# Patient Record
Sex: MALE | Race: WHITE | NOT HISPANIC OR LATINO | Employment: OTHER | URBAN - METROPOLITAN AREA
[De-identification: names, ages, dates, MRNs, and addresses within clinical notes are randomized per-mention and may not be internally consistent; named-entity substitution may affect disease eponyms.]

---

## 2019-04-02 ENCOUNTER — TRANSFERRED RECORDS (OUTPATIENT)
Dept: HEALTH INFORMATION MANAGEMENT | Facility: CLINIC | Age: 69
End: 2019-04-02

## 2019-05-08 ENCOUNTER — PRE VISIT (OUTPATIENT)
Dept: SLEEP MEDICINE | Facility: CLINIC | Age: 69
End: 2019-05-08

## 2019-05-08 RX ORDER — FEBUXOSTAT 40 MG/1
40 TABLET, FILM COATED ORAL
COMMUNITY
Start: 2019-03-15

## 2019-05-08 RX ORDER — PANTOPRAZOLE SODIUM 40 MG/1
40 TABLET, DELAYED RELEASE ORAL
COMMUNITY
Start: 2019-03-15

## 2019-05-08 RX ORDER — ATENOLOL 50 MG/1
25 TABLET ORAL
COMMUNITY
Start: 2019-03-15

## 2019-05-08 NOTE — TELEPHONE ENCOUNTER
"  1.  Reason for the visit:  Consult to discuss possible sleep apnea  2.  Referring provider and clinic name:  Dr. Cornelio Green  3.  Previous Sleep Doctor or Pulmonlogist (clinic name)?  None noted  4.  Records, Procedures, Imaging, and Labs (see below)  No sleep records to obtain, but pt had [facility] records faxed over        All NOTES from previous office visits that pertain to why they are being seen in the Sleep Center    Previous Sleep Studies, Chest CT, Echos and reports that pertain to why they are seeing Sleep Center    All Sleep records that have been done in the last 2 years that pertain to why they are seeing Sleep Center            Are they being seen for continuation of care for Cpap/Bipap/Avap/Trilogy/Dental Device? None    If yes to above Who and Where was Device issued/currently getting supplies from? na    Are you currently on \"Supplemental Oxygen\" during the day or night?   na                                                                                                                                                      Please remind pt to bring Cpap machine and ask to arrive 15 minutes early to appointment due traffic and congestion                                                 5. Pt Sleep Center Packet received Message left asking pt to arrive 30 minutes early to appointment if no packet received.        Yes: \"please make sure that you bring this to your appointment completed, either the doctor will not see you until this completed or you may be asked to reschedule your appointment.\"     No: mail or email to the pt and explain, \"please make sure that you bring this to your appointment completed, either the doctor will not see you until this completed or you may be asked to reschedule your appointment.\"     ~If pt coming early to fill packet out, ask that they come 30 minutes prior to their appointment~     6. Has the pt's medication list been updated and preferred pharmacy added?     7. " "Has the allergy list been reviewed?    \"Thank you for choosing [facility] and we look forward to seeing you at your upcoming appointment\"     "

## 2019-05-09 ENCOUNTER — OFFICE VISIT (OUTPATIENT)
Dept: SLEEP MEDICINE | Facility: CLINIC | Age: 69
End: 2019-05-09
Payer: MEDICARE

## 2019-05-09 VITALS
HEIGHT: 64 IN | DIASTOLIC BLOOD PRESSURE: 81 MMHG | OXYGEN SATURATION: 98 % | HEART RATE: 66 BPM | WEIGHT: 184 LBS | BODY MASS INDEX: 31.41 KG/M2 | RESPIRATION RATE: 16 BRPM | SYSTOLIC BLOOD PRESSURE: 140 MMHG

## 2019-05-09 DIAGNOSIS — D75.1 ERYTHROCYTOSIS: ICD-10-CM

## 2019-05-09 PROCEDURE — 99204 OFFICE O/P NEW MOD 45 MIN: CPT | Performed by: INTERNAL MEDICINE

## 2019-05-09 ASSESSMENT — MIFFLIN-ST. JEOR: SCORE: 1520.62

## 2019-05-09 NOTE — PROGRESS NOTES
Chief complaint: Consultation requested by Ashok Lewis MD for the evaluation of possible hypoxemia    History of Present Illness: 68-year-old gentleman recently diagnosed with chronic lymphocytic leukemia.  He has a history of mild erythrocytosis for a few years.  Evaluation in progress.  Patient has a history of some mild intermittent snoring. He had developed fatigue particularly in the last year prior to this diagnosis.  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  He is managing this very well however and denies difficulty falling asleep.  He has a strong routine.  No over-the-counter or prescribed sleep aids.  Typically will get into bed to sleep around 11:30 pm and fall asleep in less than 15 minutes.  He usually is out of bed around 6:30 in the morning.  He has been taking more naps in the last year currently up to about 2 to 3/week, 1 to 1-1/2 hours and he otherwise denies excessive sleepiness.  No sleep attacks or sleepiness behind the wheel.  He does not drink excessive caffeine.  2-3 8-10 oz Diet Cokes a day, sometimes as late as dinnertime.  He does wake up about once a night to go to the bathroom.  He usually can fall back asleep quickly.     He denies symptoms of restless legs.  No sleepwalking, sleep talking, or dream enactment behavior. No bruxism or morning headaches. He thinks he snores only a couple of nights a week and not particularly loud.  If the air is very dry he will have more nasal congestion.  He usually sleeps on his stomach.  There is no known family history of obstructive sleep apnea but his father snored.  Overall his weight has been stable.  He does not drink excessive alcohol.  Maybe 1 glass a week.  He tolerates travel across time zones very well.    There is no history of smoking or risks for lung disease.  No history of asthma.  No significant cardiovascular disease history.  He denies symptoms of PND, orthopnea, nocturnal  gasping or choking.  No symptoms of dyspnea with exertion during the day.  He gets regular, vigorous exercise and denies cardiopulmonary limitation.  Is able to sleep on a flat bed.  He is not on exogenous testosterone.     [facility] Seepiness Scale:4 (Less than 10 normal)    FARIDA Total Score: 13    STOP-BANG  Loud Snore   0  Excessively Tired/Sleepy   0  Observed apnea   0  Hypertension   0  BMI> 35 kg/m2   0  Age >50   1  Neck >16 in/40cm   1  Male Gender   1  Total =   3  (0-2 low, 3-4 intermediate, 5-8 high risk of KEENAN)      Past Medical History:   Diagnosis Date     Adenomatous polyp      Angioedema      Travis's oesophagus with dysplasia      CLL (chronic lymphocytic leukemia) (H) 03/22/2019    stage at dx Wilkes 0, CD 20 positive 11q del unkown, 17p del unknown     Gastro-oesophageal reflux disease      Hearing loss     mild     Hyperuricemia      Psoriasis      Renal disease     Borderline renal insufficiency; kidney stones     Tinnitus      Vocal cord granuloma        Allergies   Allergen Reactions     Allopurinol Hives     Zyrtec [Cetirizine]      Lethhargy       Current Outpatient Medications   Medication     Ascorbic Acid (VITAMIN C PO)     ASPIRIN PO     atenolol (TENORMIN) 50 MG tablet     ATORVASTATIN CALCIUM PO     febuxostat (ULORIC) 40 MG TABS tablet     Fexofenadine HCl (ALLEGRA PO)     multivitamin, therapeutic with minerals (MULTI-VITAMIN) TABS     OMEPRAZOLE PO     pantoprazole (PROTONIX) 40 MG EC tablet     Solifenacin Succinate (VESICARE PO)     No current facility-administered medications for this visit.        Social History     Socioeconomic History     Marital status:      Spouse name: Not on file     Number of children: Not on file     Years of education: Not on file     Highest education level: Not on file   Occupational History     Not on file   Social Needs     Financial resource strain: Not on file     Food insecurity:     Worry: Not on file     Inability: Not on file      "Transportation needs:     Medical: Not on file     Non-medical: Not on file   Tobacco Use     Smoking status: Never Smoker     Smokeless tobacco: Never Used   Substance and Sexual Activity     Alcohol use: Yes     Comment: 1 per week     Drug use: No     Sexual activity: Not on file   Lifestyle     Physical activity:     Days per week: Not on file     Minutes per session: Not on file     Stress: Not on file   Relationships     Social connections:     Talks on phone: Not on file     Gets together: Not on file     Attends Sabianist service: Not on file     Active member of club or organization: Not on file     Attends meetings of clubs or organizations: Not on file     Relationship status: Not on file     Intimate partner violence:     Fear of current or ex partner: Not on file     Emotionally abused: Not on file     Physically abused: Not on file     Forced sexual activity: Not on file   Other Topics Concern     Parent/sibling w/ CABG, MI or angioplasty before 65F 55M? Not Asked   Social History Narrative     Not on file       Family History   Problem Relation Age of Onset     Coronary Artery Disease Mother      Hypertension Father      Cerebrovascular Disease Father      Gallbladder Disease Father      Snoring Father      Prostate Cancer Father 70        skin ca 50     Coronary Artery Disease Paternal Grandfather      Alcoholism Brother        Review of Systems: Positve for occasional mole in her infected lymph nodes this is been long-standing, some changes in his vision, occasional sinus pain, occasional elevated blood pressure measurements, and per HPI, otherwise comprehensive review of systems is negative.    EXAM:Alert, no distress, breathing appears comfortable  /81   Pulse 66   Resp 16   Ht 1.626 m (5' 4\")   Wt 83.5 kg (184 lb)   SpO2 98%   BMI 31.58 kg/m    HEENT: Normocephalic/atraumatic, pupils are equal, reactive to light, no scleral icterus  Oropharynx: Mallampati 4, unable to visualize " posterior pharynx  Neck supple no lymphadenopathy, neck circumference 43 cm  Chest: Clear to auscultation bilaterally, no rales or wheezes  Cardiac: Regular rate and rhythm, S1-S2 normal  Abdomen: Obese, soft and nontender, bowel sounds present  Extremities: Warm, well perfused, no cyanosis clubbing or edema  Psychiatric: Mood and affect appear normal  Neurologic: No gross focal deficits    Outside labs reviewed:  Erythropoietin level 4/2/2019 normal at 13.3  Hemoglobin range 16.6 up to 18.4 and 2015    Chest x-ray on 11/20/2015 reported by radiology is normal    ASSESSMENT: 68-year-old gentleman with history of sleep initiation insomnia currently well controlled.  He recent history of increasing fatigue and diagnosis of chronic lymphocytic leukemia.  Erythrocytosis has been intermittently present at least for the last 4 years.  He has some signs of risk for obstructive sleep apnea however no significant symptoms.  Do not suspect underlying cardiopulmonary disease, no risk factors for hypoventilation.  I would suspect that significant hypoxemia would lead to elevations in erythropoietin levels in order to stimulate compensatory erythrocytosis.    PLAN: At this point we discussed looking for objective evidence of hypoxemia with overnight oximetry in the home versus in lab polysomnography.  Elected to proceed with overnight oximetry given low to intermediate risk of obstructive sleep apnea and patient's preference.  I think this would be reasonable.  If indeed he has significant hypoxemia detected by overnight oximetry patient it would be agreeable to proceeding more detailed and sensitive testing in the sleep lab.  If this is the case would recommend pulmonary function testing and transcutaneous CO2 monitoring as well as ABG testing prior.  I will be contacting patient via [employer] with results of the overnight oximetry when they become available.    Bernadine Santoro M.D.  Pulmonary/Critical Care/Sleep Medicine    [facility]  [facility]   Floor 1, [address]   [address] [place] [address]   Appointments: [phone]    The above note was dictated using voice recognition software and may include typographical errors. Please contact the author for any clarifications.

## 2019-05-09 NOTE — LETTER
5/9/2019         RE: Willard Burgos  [address]  [place] [address]        Dear Colleague,    Thank you for referring your patient, Willard Burgos, to the [facility]. Please see a copy of my visit note below.    Chief complaint: Consultation requested by Ashok Lewis MD for the evaluation of possible hypoxemia    History of Present Illness: 68-year-old gentleman recently diagnosed with chronic lymphocytic leukemia.  He has a history of mild erythrocytosis for a few years.  Evaluation in progress.  Patient has a history of some mild intermittent snoring. He had developed fatigue particularly in the last year prior to this diagnosis.  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  He is managing this very well however and denies difficulty falling asleep.  He has a strong routine.  No over-the-counter or prescribed sleep aids.  Typically will get into bed to sleep around 11:30 pm and fall asleep in less than 15 minutes.  He usually is out of bed around 6:30 in the morning.  He has been taking more naps in the last year currently up to about 2 to 3/week, 1 to 1-1/2 hours and he otherwise denies excessive sleepiness.  No sleep attacks or sleepiness behind the wheel.  He does not drink excessive caffeine.  2-3 8-10 oz Diet Cokes a day, sometimes as late as dinnertime.  He does wake up about once a night to go to the bathroom.  He usually can fall back asleep quickly.     He denies symptoms of restless legs.  No sleepwalking, sleep talking, or dream enactment behavior. No bruxism or morning headaches. He thinks he snores only a couple of nights a week and not particularly loud.  If the air is very dry he will have more nasal congestion.  He usually sleeps on his stomach.  There is no known family history of obstructive sleep apnea but his father snored.  Overall his weight has been stable.  He does not drink excessive alcohol.  Maybe 1  glass a week.  He tolerates travel across time zones very well.    There is no history of smoking or risks for lung disease.  No history of asthma.  No significant cardiovascular disease history.  He denies symptoms of PND, orthopnea, nocturnal gasping or choking.  No symptoms of dyspnea with exertion during the day.  He gets regular, vigorous exercise and denies cardiopulmonary limitation.  Is able to sleep on a flat bed.  He is not on exogenous testosterone.     [place] Seepiness Scale:4 (Less than 10 normal)    FARIDA Total Score: 13    STOP-BANG  Loud Snore   0  Excessively Tired/Sleepy   0  Observed apnea   0  Hypertension   0  BMI> 35 kg/m2   0  Age >50   1  Neck >16 in/40cm   1  Male Gender   1  Total =   3  (0-2 low, 3-4 intermediate, 5-8 high risk of KEENAN)      Past Medical History:   Diagnosis Date     Adenomatous polyp      Angioedema      Travis's oesophagus with dysplasia      CLL (chronic lymphocytic leukemia) (H) 03/22/2019    stage at dx Wilkes 0, CD 20 positive 11q del unkown, 17p del unknown     Gastro-oesophageal reflux disease      Hearing loss     mild     Hyperuricemia      Psoriasis      Renal disease     Borderline renal insufficiency; kidney stones     Tinnitus      Vocal cord granuloma        Allergies   Allergen Reactions     Allopurinol Hives     Zyrtec [Cetirizine]      Lethhargy       Current Outpatient Medications   Medication     Ascorbic Acid (VITAMIN C PO)     ASPIRIN PO     atenolol (TENORMIN) 50 MG tablet     ATORVASTATIN CALCIUM PO     febuxostat (ULORIC) 40 MG TABS tablet     Fexofenadine HCl (ALLEGRA PO)     multivitamin, therapeutic with minerals (MULTI-VITAMIN) TABS     OMEPRAZOLE PO     pantoprazole (PROTONIX) 40 MG EC tablet     Solifenacin Succinate (VESICARE PO)     No current facility-administered medications for this visit.        Social History     Socioeconomic History     Marital status:      Spouse name: Not on file     Number of children: Not on file     Years of  education: Not on file     Highest education level: Not on file   Occupational History     Not on file   Social Needs     Financial resource strain: Not on file     Food insecurity:     Worry: Not on file     Inability: Not on file     Transportation needs:     Medical: Not on file     Non-medical: Not on file   Tobacco Use     Smoking status: Never Smoker     Smokeless tobacco: Never Used   Substance and Sexual Activity     Alcohol use: Yes     Comment: 1 per week     Drug use: No     Sexual activity: Not on file   Lifestyle     Physical activity:     Days per week: Not on file     Minutes per session: Not on file     Stress: Not on file   Relationships     Social connections:     Talks on phone: Not on file     Gets together: Not on file     Attends Hinduism service: Not on file     Active member of club or organization: Not on file     Attends meetings of clubs or organizations: Not on file     Relationship status: Not on file     Intimate partner violence:     Fear of current or ex partner: Not on file     Emotionally abused: Not on file     Physically abused: Not on file     Forced sexual activity: Not on file   Other Topics Concern     Parent/sibling w/ CABG, MI or angioplasty before 65F 55M? Not Asked   Social History Narrative     Not on file       Family History   Problem Relation Age of Onset     Coronary Artery Disease Mother      Hypertension Father      Cerebrovascular Disease Father      Gallbladder Disease Father      Snoring Father      Prostate Cancer Father 70        skin ca 50     Coronary Artery Disease Paternal Grandfather      Alcoholism Brother        Review of Systems: Positve for occasional mole in her infected lymph nodes this is been long-standing, some changes in his vision, occasional sinus pain, occasional elevated blood pressure measurements, and per HPI, otherwise comprehensive review of systems is negative.    EXAM:Alert, no distress, breathing appears comfortable  /81    "Pulse 66   Resp 16   Ht 1.626 m (5' 4\")   Wt 83.5 kg (184 lb)   SpO2 98%   BMI 31.58 kg/m     HEENT: Normocephalic/atraumatic, pupils are equal, reactive to light, no scleral icterus  Oropharynx: Mallampati 4, unable to visualize posterior pharynx  Neck supple no lymphadenopathy, neck circumference 43 cm  Chest: Clear to auscultation bilaterally, no rales or wheezes  Cardiac: Regular rate and rhythm, S1-S2 normal  Abdomen: Obese, soft and nontender, bowel sounds present  Extremities: Warm, well perfused, no cyanosis clubbing or edema  Psychiatric: Mood and affect appear normal  Neurologic: No gross focal deficits    Outside labs reviewed:  Erythropoietin level 4/2/2019 normal at 13.3  Hemoglobin range 16.6 up to 18.4 and 2015    Chest x-ray on 11/20/2015 reported by radiology is normal    ASSESSMENT: 68-year-old gentleman with history of sleep initiation insomnia currently well controlled.  He recent history of increasing fatigue and diagnosis of chronic lymphocytic leukemia.  Erythrocytosis has been intermittently present at least for the last 4 years.  He has some signs of risk for obstructive sleep apnea however no significant symptoms.  Do not suspect underlying cardiopulmonary disease, no risk factors for hypoventilation.  I would suspect that significant hypoxemia would lead to elevations in erythropoietin levels in order to stimulate compensatory erythrocytosis.    PLAN: At this point we discussed looking for objective evidence of hypoxemia with overnight oximetry in the home versus in lab polysomnography.  Elected to proceed with overnight oximetry given low to intermediate risk of obstructive sleep apnea and patient's preference.  I think this would be reasonable.  If indeed he has significant hypoxemia detected by overnight oximetry patient it would be agreeable to proceeding more detailed and sensitive testing in the sleep lab.  If this is the case would recommend pulmonary function testing and " transcutaneous CO2 monitoring as well as ABG testing prior.  I will be contacting patient via [employer] with results of the overnight oximetry when they become available.    Bernadine Santoro M.D.  Pulmonary/Critical Care/Sleep Medicine    [facility]   Floor 1, [address]   [address] [place] [address]   Appointments: [phone]    The above note was dictated using voice recognition software and may include typographical errors. Please contact the author for any clarifications.              Again, thank you for allowing me to participate in the care of your patient.        Sincerely,        Bernadine Santoro MD

## 2019-05-09 NOTE — PATIENT INSTRUCTIONS
Overnight oximetry in the home--usual routine    I stephanie contact you by Alina with results.    Your BMI is Body mass index is 31.58 kg/m .  Weight management is a personal decision.  If you are interested in exploring weight loss strategies, the following discussion covers the approaches that may be successful. Body mass index (BMI) is one way to tell whether you are at a healthy weight, overweight, or obese. It measures your weight in relation to your height.  A BMI of 18.5 to 24.9 is in the healthy range. A person with a BMI of 25 to 29.9 is considered overweight, and someone with a BMI of 30 or greater is considered obese. More than two-thirds of American adults are considered overweight or obese.  Being overweight or obese increases the risk for further weight gain. Excess weight may lead to heart disease and diabetes.  Creating and following plans for healthy eating and physical activity may help you improve your health.  Weight control is part of healthy lifestyle and includes exercise, emotional health, and healthy eating habits. Careful eating habits lifelong are the mainstay of weight control. Though there are significant health benefits from weight loss, long-term weight loss with diet alone may be very difficult to achieve- studies show long-term success with dietary management in less than 10% of people. Attaining a healthy weight may be especially difficult to achieve in those with severe obesity. In some cases, medications, devices and surgical management might be considered.  What can you do?  If you are overweight or obese and are interested in methods for weight loss, you should discuss this with your provider.     Consider reducing daily calorie intake by 500 calories.     Keep a food journal.     Avoiding skipping meals, consider cutting portions instead.    Diet combined with exercise helps maintain muscle while optimizing fat loss. Strength training is particularly important for building and  maintaining muscle mass. Exercise helps reduce stress, increase energy, and improves fitness. Increasing exercise without diet control, however, may not burn enough calories to loose weight.       Start walking three days a week 10-20 minutes at a time    Work towards walking thirty minutes five days a week     Eventually, increase the speed of your walking for 1-2 minutes at time    In addition, we recommend that you review healthy lifestyles and methods for weight loss available through the National Institutes of Health patient information sites:  http://win.niddk.nih.gov/publications/index.htm    And look into health and wellness programs that may be available through your health insurance provider, employer, local community center, or harvey club.    Weight management plan: Patient was referred to their PCP to discuss a diet and exercise plan.

## 2019-06-04 ENCOUNTER — OFFICE VISIT (OUTPATIENT)
Dept: SLEEP MEDICINE | Facility: CLINIC | Age: 69
End: 2019-06-04
Payer: MEDICARE

## 2019-06-04 DIAGNOSIS — D75.1 ERYTHROCYTOSIS: ICD-10-CM

## 2019-06-04 NOTE — PROGRESS NOTES
Patient instructed on SHONDA use. Patient demonstrated and verbalized knowledge of use. Insurance was verified by financial securing. Device programmed. Device will be returned tomorrow before noon.    Devendra Limon  Sleep Clinic Specialist - [place]

## 2019-06-05 ENCOUNTER — DOCUMENTATION ONLY (OUTPATIENT)
Dept: SLEEP MEDICINE | Facility: CLINIC | Age: 69
End: 2019-06-05
Payer: MEDICARE

## 2019-06-05 NOTE — LETTER
June 6, 2019    Dear Willard Burgos:     OVERNIGHT OXIMETRY INTERPRETATION     I reviewed the overnight oximetry testing results from 4 June 2019 done on room air and they are satisfactory.  Analysis time:  6 Hours 50 Minutes.  Time at or below 88% oxygen saturation 0.1 Minute.  Lowest oxygen saturation 87 %  Oxygen desaturation index 3.5  Pattern consistent with normoxia.     Please share these results with your oncologist       Thank You,         Bernadine Santoro M.D.  Pulmonary/Critical Care/Sleep Medicine            If you have any other questions please contact the clinic at [phone].

## 2019-06-05 NOTE — Clinical Note
SHONDA has been returned, downloaded, and report has been placed in providers folder.Devendra [facility] Specialist - [place]

## 2019-06-06 NOTE — PROCEDURES
OVERNIGHT OXIMETRY INTERPRETATION     I reviewed the overnight oximetry testing results from 4 June 2019 done on room air and they are satisfactory.  Analysis time:  6 Hours 50 Minutes.  Time at or below 88% oxygen saturation 0.1 Minute.  Lowest oxygen saturation 87 %  Oxygen desaturation index 3.5  Pattern consistent with normoxia.       Bernadine Santoro M.D.  Pulmonary/Critical Care/Sleep Medicine

## 2019-06-06 NOTE — PROGRESS NOTES
Overnight oximetry returned to clinic today 6/5/2019. The encounter was routed to the provider for review. A copy of these results have also been scanned.    Recording date: 6/4/2019 at 23:24:08    Duration: 06:50:52    Devendra Limon  Sleep Clinic Specialist - [place]

## 2019-06-11 ENCOUNTER — TELEPHONE (OUTPATIENT)
Dept: SLEEP MEDICINE | Facility: CLINIC | Age: 69
End: 2019-06-11

## 2019-06-11 NOTE — TELEPHONE ENCOUNTER
Reason for call:  Other   Patient called regarding (reason for call): Pt would like to discuss  results of test; please advise  Additional comments:     Phone number to reach patient:  [phone]    Best Time:  any    Can we leave a detailed message on this number?  YES

## 2019-06-19 ENCOUNTER — VIRTUAL VISIT (OUTPATIENT)
Dept: SLEEP MEDICINE | Facility: CLINIC | Age: 69
End: 2019-06-19
Payer: MEDICARE

## 2019-06-19 DIAGNOSIS — D75.1 ERYTHROCYTOSIS: Primary | ICD-10-CM

## 2019-06-19 PROCEDURE — G2012 BRIEF CHECK IN BY MD/QHP: HCPCS | Performed by: INTERNAL MEDICINE

## 2019-06-19 NOTE — LETTER
6/19/2019        RE: Willard Burgos  [address]  [facility] [address]        Patient scheduled to virtual visit to discuss results of overnight oximetry    68-year-old gentleman with history of leukemia and erythrocytosis.  He was referred for evaluation of possible hypoxemia as the  for erythrocytosis.  He did not present with significant symptoms concerning for sleep apnea.  We proceeded with overnight oximetry.  Those results were sent to him and he has had a chance to review them.  We reviewed them in detail today over the phone.      The results appeared to be good quality recording.  There was no significant hypoxemia with time less than 88% of less than 1 minute.  There were desaturation events noted however the index was 3.5/h.  The pattern could be consistent with an area of REM or supine sleep.  But again clinically there was no suspicion for significant sleep apnea.     Patient already shared these results with his oncologist.  Oncologist was satisfied.  They are going to pursue further blood and bone marrow testing to evaluate the erythrocytosis.  Patient is agreeable with this plan he is encouraged to return to the sleep clinic should any new issues arise.  He is can follow-up as needed.    Total time on phone with patient 56 minutes.    Bernadine Santoro MD  Pulmonary and Sleep Medicine   of Medicine  [employer] [facility] [employer]          Sincerely,        Bernadine Santoro MD

## 2019-06-19 NOTE — PROGRESS NOTES
Patient scheduled to virtual visit to discuss results of overnight oximetry    68-year-old gentleman with history of leukemia and erythrocytosis.  He was referred for evaluation of possible hypoxemia as the  for erythrocytosis.  He did not present with significant symptoms concerning for sleep apnea.  We proceeded with overnight oximetry.  Those results were sent to him and he has had a chance to review them.  We reviewed them in detail today over the phone.      The results appeared to be good quality recording.  There was no significant hypoxemia with time less than 88% of less than 1 minute.  There were desaturation events noted however the index was 3.5/h.  The pattern could be consistent with an area of REM or supine sleep.  But again clinically there was no suspicion for significant sleep apnea.     Patient already shared these results with his oncologist.  Oncologist was satisfied.  They are going to pursue further blood and bone marrow testing to evaluate the erythrocytosis.  Patient is agreeable with this plan he is encouraged to return to the sleep clinic should any new issues arise.  He is can follow-up as needed.    Total time on phone with patient 6 minutes.    Bernadine Santoro MD  Pulmonary and Sleep Medicine   of Medicine  [facility]

## 2020-01-22 ENCOUNTER — TRANSFERRED RECORDS (OUTPATIENT)
Dept: HEALTH INFORMATION MANAGEMENT | Facility: CLINIC | Age: 70
End: 2020-01-22

## 2020-01-27 ENCOUNTER — TRANSFERRED RECORDS (OUTPATIENT)
Dept: HEALTH INFORMATION MANAGEMENT | Facility: CLINIC | Age: 70
End: 2020-01-27
Payer: MEDICARE

## 2020-06-30 ENCOUNTER — TRANSFERRED RECORDS (OUTPATIENT)
Dept: HEALTH INFORMATION MANAGEMENT | Facility: CLINIC | Age: 70
End: 2020-06-30

## 2020-10-06 ENCOUNTER — TRANSFERRED RECORDS (OUTPATIENT)
Dept: HEALTH INFORMATION MANAGEMENT | Facility: CLINIC | Age: 70
End: 2020-10-06

## 2020-10-21 ENCOUNTER — TRANSFERRED RECORDS (OUTPATIENT)
Dept: HEALTH INFORMATION MANAGEMENT | Facility: CLINIC | Age: 70
End: 2020-10-21

## 2020-10-22 ENCOUNTER — HOSPITAL ENCOUNTER (OUTPATIENT)
Dept: NUCLEAR MEDICINE | Facility: CLINIC | Age: 70
Setting detail: NUCLEAR MEDICINE
End: 2020-10-22
Attending: UROLOGY
Payer: MEDICARE

## 2020-10-22 ENCOUNTER — HOSPITAL ENCOUNTER (OUTPATIENT)
Dept: CT IMAGING | Facility: CLINIC | Age: 70
Discharge: HOME | End: 2020-10-22
Attending: UROLOGY | Admitting: UROLOGY
Payer: MEDICARE

## 2020-10-22 DIAGNOSIS — C61 PROSTATE CANCER (H): ICD-10-CM

## 2020-10-22 PROCEDURE — 250N000011 HC RX IP 250 OP 636: Performed by: UROLOGY

## 2020-10-22 PROCEDURE — 78306 BONE IMAGING WHOLE BODY: CPT

## 2020-10-22 PROCEDURE — A9503 TC99M MEDRONATE: HCPCS | Performed by: UROLOGY

## 2020-10-22 PROCEDURE — 74177 CT ABD & PELVIS W/CONTRAST: CPT

## 2020-10-22 PROCEDURE — 250N000009 HC RX 250: Performed by: UROLOGY

## 2020-10-22 PROCEDURE — 343N000001 HC RX 343: Performed by: UROLOGY

## 2020-10-22 RX ORDER — TC 99M MEDRONATE 20 MG/10ML
25 INJECTION, POWDER, LYOPHILIZED, FOR SOLUTION INTRAVENOUS ONCE
Status: COMPLETED | OUTPATIENT
Start: 2020-10-22 | End: 2020-10-22

## 2020-10-22 RX ORDER — IOPAMIDOL 755 MG/ML
90 INJECTION, SOLUTION INTRAVASCULAR ONCE
Status: COMPLETED | OUTPATIENT
Start: 2020-10-22 | End: 2020-10-22

## 2020-10-22 RX ADMIN — TC 99M MEDRONATE 26.8 MCI.: 20 INJECTION, POWDER, LYOPHILIZED, FOR SOLUTION INTRAVENOUS at 07:35

## 2020-10-22 RX ADMIN — IOPAMIDOL 90 ML: 755 INJECTION, SOLUTION INTRAVENOUS at 07:23

## 2020-10-22 RX ADMIN — SODIUM CHLORIDE 66 ML: 9 INJECTION, SOLUTION INTRAVENOUS at 07:23

## 2020-11-10 ENCOUNTER — TRANSFERRED RECORDS (OUTPATIENT)
Dept: HEALTH INFORMATION MANAGEMENT | Facility: CLINIC | Age: 70
End: 2020-11-10

## 2021-06-30 ENCOUNTER — TRANSFERRED RECORDS (OUTPATIENT)
Dept: HEALTH INFORMATION MANAGEMENT | Facility: CLINIC | Age: 71
End: 2021-06-30

## 2021-07-07 ENCOUNTER — TRANSFERRED RECORDS (OUTPATIENT)
Dept: HEALTH INFORMATION MANAGEMENT | Facility: CLINIC | Age: 71
End: 2021-07-07

## 2021-08-17 ENCOUNTER — TRANSFERRED RECORDS (OUTPATIENT)
Dept: HEALTH INFORMATION MANAGEMENT | Facility: CLINIC | Age: 71
End: 2021-08-17

## 2021-09-23 ENCOUNTER — TRANSFERRED RECORDS (OUTPATIENT)
Dept: HEALTH INFORMATION MANAGEMENT | Facility: CLINIC | Age: 71
End: 2021-09-23

## 2021-10-07 ENCOUNTER — TRANSCRIBE ORDERS (OUTPATIENT)
Dept: OTHER | Age: 71
End: 2021-10-07

## 2021-10-07 DIAGNOSIS — C91.10 CLL (CHRONIC LYMPHOCYTIC LEUKEMIA) (H): Primary | ICD-10-CM

## 2021-10-08 ENCOUNTER — PATIENT OUTREACH (OUTPATIENT)
Dept: ONCOLOGY | Facility: CLINIC | Age: 71
End: 2021-10-08

## 2021-10-08 NOTE — PROGRESS NOTES
OUTGOING CALL to pt: Introduced my role as nurse navigator with [facility] Hematology/Oncology dept and that we have recd the referral for dx of CLL/ transfer of care to Dr Ashok Lewis at [facility].   Mr Burgos tells me he was due for routine visit a few weeks ago, wanting to schedule asap with Dr Lewis, has questions about covid protection with Pfizer and Moderna vaccines. Has had antibody testing via LLS study.   Pt is ready to schedule and was transferred pt to NPS line [phone] to schedule appt per scheduling instructions, to include lab draw 30 min prior to MD visit. Provided the [facility] phone number for future questions/concerns. Pt voiced understanding of above instructions and information and denied further questions    Routing phone message to Dr Lewis to place lab orders for the visit.    Nandini Landry, RN, BSN, OCN  Hematology/Oncology Nurse Navigator  [facility]  [phone]

## 2021-10-10 DIAGNOSIS — C91.10 CLL (CHRONIC LYMPHOCYTIC LEUKEMIA) (H): ICD-10-CM

## 2021-10-10 DIAGNOSIS — C61 PROSTATE CANCER (H): Primary | ICD-10-CM

## 2021-10-24 NOTE — PROGRESS NOTES
Oncology/Hematology Visit Note  Oct 25, 2021    Reason for Visit: follow up of his CLL and prostate cancer    ASSESSMENT:  1. High risk, though still localized, prostate cancer, status post androgen deprivation and IMRT.  This is being managed by a urologist at [facility].  His most recent PSA is unmeasurable.  I am not actively managing this progress  2. Stage I chronic lymphocytic leukemia with a FISH panel showing trisomy 12, indicating moderate to low risk disease.  He has never had an indication for treatment.  Interestingly, with pelvic radiotherapy, his lymphocytosis normalized, indicating response to the pelvic lymph nodes to the radiation.  His CBC today looks very reassuring.  While I do not have a differential, his white count is not elevated.  3. 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  This implies to me that it was physiologic and I am not going to do any more ancillary testing.  4. Multiple polyps for which she needs continued serial colonoscopies.  5. 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  6. Not surprisingly, he does not have much of the antibody response to the Pfizer vaccine.  We know that CLL patients lacked disability.  Interestingly, after his most recent vaccine, he does have some titers.  We will just keep watching this without in the prevention.  7. Finally, it is impossible not to notice that now his wife has CLL to and they both live in a house full of radon.  They will do what they can to mitigate things.  We did do a brief literature review and there is some data to support the radon exposure does increase the risk of hematologic malignancy.        PLAN:  1. No intervention by me  2. Continue follow-up with his urologist for androgen deprivation  3. Return to clinic with me in 6 months with a CBC as we continue in follow-up mode    Ashok Lewis MD        History of Present Illness:     DIAGNOSIS:  1. Stage I chronic lymphocytic  leukemia with FISH panel showing trisomy 12, indicating moderate to low risk disease.  There is never been an indication for treatment for Ross.  2. Erythrocytosis that appears to be pathologic/physiologic.  Rest had a normal EPO level as well as an elevated carbon monoxide level.  A complete thorough work-up did not reveal a source of the carbon monoxide.  With observation this is all normalized, interestingly.  3. Clinical T2 cN0 M0 high risk prostate cancer, diagnosed at prostate biopsy 10/6/2020.  Rest presented with a PSA of 8.47 when it had previously been 2.09.  This was in March 2019.  Prostate MRI showed 2 separate lesions with a classification of 4 with high probability of clinically significant cancer.  There was some elevated enlarged lymph nodes consistent with his CLL.  Biopsy 10/6/2020 showed multiple areas of cancer bilaterally with the most concerning in the right lateral base because was a Elizabeth 4+4 = 8 and 25% of the core.  There was also a [place] 8 and 50% of the core in the right lateral mid, and another Elizabeth 8 in the left base and left lateral base.  The right base had 40% involvement with Elizabeth 4+3.    THERAPY TO DATE:  1. We have never treated his CLL or his erythrocytosis  2. For his prostate cancer he had androgen deprivation initiated 11/24/2020 followed by IMRT 1/4 through 2/10/2021      Interval History:    Bob is back with Chelsey.  He gives me the very unusual news that Chelsey herself was just diagnosed with CLL.  They both wonder about the radon in their household and whether or not this is a risk.    After getting to Pfizer Covid vaccines, he did just get a Moderna as well.  He had no antibody titers prior to the [place], but now he does have some.    He has not had any illness at all.  He denies cough or fever or shortness of breath.  He been doing a fair amount of traveling and are planning to go to [place] in a few months for a week of theater.    He is still getting  androgen deprivation as part of his prostate cancer therapy.    The rest of the multiorgan review of physical symptoms is negative.    Social History:  Bob grew up in [place], [place], and graduated from high school in 1969.  He then went to college, majoring in math and business with a minor in creative writing.  He thought about studying theater, but apparently this was not allowed at his college.  Before graduating, he was excepted into the business masters program.  Upon graduation he was recruited by [employer] and moved to [place].  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  He met his wife Chelsey while he was in [place].  A friend of Jeromes had been interested in Willard, but by the time he decided to approach her, Chelsey's friend was .  The friend gave him some names of single friends and when muscle called, Chelsey answered.  Chelsey's family has been living in [place] since the 1880s.  They have 2 daughters together.  Their older daughter has a PhD in biology as well as a law degree and works with [employer] in [place].  The younger daughter went to [employer] and got a job as a contractor dealing and foreign services based in [place].  Bob speaks 3 languages, English, bad Hungarian, and bad Icelandic.    Review of Systems:  Patient denies any of the following except if noted above: fevers, chills, difficulty with energy, vision or hearing changes, chest pain, dyspnea, abdominal pain, nausea, vomiting, diarrhea, constipation, urinary concerns, headaches, numbness, tingling, issues with sleep or mood. He/She also denies lumps, bumps, rashes or skin lesions, bleeding or bruising issues.    Current Outpatient Medications   Medication Sig Dispense Refill     Ascorbic Acid (VITAMIN C PO) Take 1,000 mg by mouth daily Takes with Vit E       ASPIRIN PO Take 81 mg by mouth  daily       atenolol (TENORMIN) 50 MG tablet Take 25 mg by mouth       ATORVASTATIN CALCIUM PO Take 20 mg by mouth daily        febuxostat (ULORIC) 40 MG TABS tablet Take 40 mg by mouth       Fexofenadine HCl (ALLEGRA PO) Take 180 mg by mouth daily        multivitamin, therapeutic with minerals (MULTI-VITAMIN) TABS Take 1 tablet by mouth daily       OMEPRAZOLE PO Take 20 mg by mouth every morning       pantoprazole (PROTONIX) 40 MG EC tablet Take 40 mg by mouth       Solifenacin Succinate (VESICARE PO) Take 5 mg by mouth daily         Physical Examination:  General: The patient is a pleasant male in no acute distress.  There were no vitals taken for this visit.  Wt Readings from Last 10 Encounters:   05/09/19 83.5 kg (184 lb)   03/05/15 82.1 kg (180 lb 14.4 oz)   02/17/15 80.7 kg (178 lb)     Head: No concerning lesions  OP: No lesions  Neck: Supple, FROM  CV: S1 S2 RRR  Lungs: No dullness to percussion, CTA bilaterally  Abdomen: Bowel sounds present, soft, nontender with no palpable hepatosplenomegaly or masses.   Extremities: No lower extremity edema noted bilaterally.   Lymphatic: No cervical or supraclavicular axillary or inguinal adenopathy  Neuro: Cranial nerves II through XII are grossly intact.  Skin: No rashes, petechiae, or bruising noted on exposed skin.  Neuro: CN 2-12 grossly intact.    Laboratory Data:    WBC 9.4 (Diff pending)  Hgb 15.2  Plt 194  CMP normal  Most Recent 3 CBC's:No lab results found. Most Recent 3 BMP's:No lab results found. Most Recent 2 LFT's:No lab results found. Most Recent TSH and T4:No lab results found.  I reviewed the above labs today.    Imaging:      I reviewed the above imaging today.      Ashok Lewis MD, Msc  [facility]  [address] [place] [address] [phone]    40 minutes spent on the date of the encounter doing chart review, review of outside records, review of test results, interpretation of tests, patient visit and documentation

## 2021-10-25 ENCOUNTER — ONCOLOGY VISIT (OUTPATIENT)
Dept: ONCOLOGY | Facility: CLINIC | Age: 71
End: 2021-10-25
Attending: INTERNAL MEDICINE
Payer: MEDICARE

## 2021-10-25 ENCOUNTER — TELEPHONE (OUTPATIENT)
Dept: ONCOLOGY | Facility: CLINIC | Age: 71
End: 2021-10-25

## 2021-10-25 ENCOUNTER — APPOINTMENT (OUTPATIENT)
Dept: LAB | Facility: CLINIC | Age: 71
End: 2021-10-25
Attending: INTERNAL MEDICINE
Payer: MEDICARE

## 2021-10-25 ENCOUNTER — PRE VISIT (OUTPATIENT)
Dept: ONCOLOGY | Facility: CLINIC | Age: 71
End: 2021-10-25

## 2021-10-25 VITALS
WEIGHT: 187.5 LBS | OXYGEN SATURATION: 96 % | HEART RATE: 75 BPM | TEMPERATURE: 97.9 F | DIASTOLIC BLOOD PRESSURE: 71 MMHG | BODY MASS INDEX: 32.18 KG/M2 | RESPIRATION RATE: 16 BRPM | SYSTOLIC BLOOD PRESSURE: 140 MMHG

## 2021-10-25 DIAGNOSIS — D75.1 ERYTHROCYTOSIS: ICD-10-CM

## 2021-10-25 DIAGNOSIS — C91.10 CLL (CHRONIC LYMPHOCYTIC LEUKEMIA) (H): ICD-10-CM

## 2021-10-25 DIAGNOSIS — D75.1 ERYTHROCYTOSIS: Primary | ICD-10-CM

## 2021-10-25 LAB
ALBUMIN SERPL-MCNC: 3.9 G/DL (ref 3.4–5)
ALP SERPL-CCNC: 92 U/L (ref 40–150)
ALT SERPL W P-5'-P-CCNC: 38 U/L (ref 0–70)
ANION GAP SERPL CALCULATED.3IONS-SCNC: 6 MMOL/L (ref 3–14)
AST SERPL W P-5'-P-CCNC: 29 U/L (ref 0–45)
BASOPHILS # BLD MANUAL: 0.1 10E3/UL (ref 0–0.2)
BASOPHILS NFR BLD MANUAL: 1 %
BILIRUB SERPL-MCNC: 0.7 MG/DL (ref 0.2–1.3)
BUN SERPL-MCNC: 23 MG/DL (ref 7–30)
CALCIUM SERPL-MCNC: 9.5 MG/DL (ref 8.5–10.1)
CHLORIDE BLD-SCNC: 104 MMOL/L (ref 94–109)
CO2 SERPL-SCNC: 29 MMOL/L (ref 20–32)
CREAT SERPL-MCNC: 1 MG/DL (ref 0.66–1.25)
EOSINOPHIL # BLD MANUAL: 0.1 10E3/UL (ref 0–0.7)
EOSINOPHIL NFR BLD MANUAL: 1 %
ERYTHROCYTE [DISTWIDTH] IN BLOOD BY AUTOMATED COUNT: 12.9 % (ref 10–15)
GFR SERPL CREATININE-BSD FRML MDRD: 76 ML/MIN/1.73M2
GLUCOSE BLD-MCNC: 114 MG/DL (ref 70–99)
HCT VFR BLD AUTO: 45 % (ref 40–53)
HGB BLD-MCNC: 15.2 G/DL (ref 13.3–17.7)
LYMPHOCYTES # BLD MANUAL: 4 10E3/UL (ref 0.8–5.3)
LYMPHOCYTES NFR BLD MANUAL: 43 %
MCH RBC QN AUTO: 29.6 PG (ref 26.5–33)
MCHC RBC AUTO-ENTMCNC: 33.8 G/DL (ref 31.5–36.5)
MCV RBC AUTO: 88 FL (ref 78–100)
MONOCYTES # BLD MANUAL: 0.9 10E3/UL (ref 0–1.3)
MONOCYTES NFR BLD MANUAL: 10 %
NEUTROPHILS # BLD MANUAL: 4.2 10E3/UL (ref 1.6–8.3)
NEUTROPHILS NFR BLD MANUAL: 45 %
PLAT MORPH BLD: NORMAL
PLATELET # BLD AUTO: 194 10E3/UL (ref 150–450)
POTASSIUM BLD-SCNC: 4.2 MMOL/L (ref 3.4–5.3)
PROT SERPL-MCNC: 7.2 G/DL (ref 6.8–8.8)
RBC # BLD AUTO: 5.14 10E6/UL (ref 4.4–5.9)
RBC MORPH BLD: NORMAL
SODIUM SERPL-SCNC: 139 MMOL/L (ref 133–144)
WBC # BLD AUTO: 9.4 10E3/UL (ref 4–11)

## 2021-10-25 PROCEDURE — G0463 HOSPITAL OUTPT CLINIC VISIT: HCPCS

## 2021-10-25 PROCEDURE — 85027 COMPLETE CBC AUTOMATED: CPT | Performed by: INTERNAL MEDICINE

## 2021-10-25 PROCEDURE — 82784 ASSAY IGA/IGD/IGG/IGM EACH: CPT | Performed by: INTERNAL MEDICINE

## 2021-10-25 PROCEDURE — 36415 COLL VENOUS BLD VENIPUNCTURE: CPT | Performed by: INTERNAL MEDICINE

## 2021-10-25 PROCEDURE — 99215 OFFICE O/P EST HI 40 MIN: CPT | Performed by: INTERNAL MEDICINE

## 2021-10-25 PROCEDURE — 80053 COMPREHEN METABOLIC PANEL: CPT | Performed by: INTERNAL MEDICINE

## 2021-10-25 RX ORDER — TAMSULOSIN HYDROCHLORIDE 0.4 MG/1
CAPSULE ORAL
COMMUNITY
Start: 2021-09-04

## 2021-10-25 RX ORDER — ICOSAPENT ETHYL 1000 MG/1
2 CAPSULE ORAL 2 TIMES DAILY
COMMUNITY
Start: 2021-09-07

## 2021-10-25 ASSESSMENT — PAIN SCALES - GENERAL: PAINLEVEL: NO PAIN (0)

## 2021-10-25 NOTE — NURSING NOTE
Chief Complaint   Patient presents with     Blood Draw     Labs drawn via  by RN. VS taken.     Labs drawn with vpt by rn.  Pt tolerated well.  VS taken.  Pt checked in for next appt.    Fransico Omalley RN

## 2021-10-25 NOTE — TELEPHONE ENCOUNTER
----- Message from Ashok Lewis MD sent at 10/24/2021  1:49 PM CDT -----  Regarding: Half hour later  Good morning, Anitha!   Bob is scheduled at 1, but I am presenting at a national meeting over Zoom from 12-1:30. Can someone call Bob and tell him that his appt is at 1:30, not 1:00? It will work out fine because there is no one currently scheduled at 1:00. (I was suppose to be off from 11:30-1:30...)  Ashok    TC to pt per Dr. Lewis. Pt stated understanding of time change and will come for labs at 1245 with MD visit at 1330.

## 2021-10-25 NOTE — NURSING NOTE
"Oncology Rooming Note    October 25, 2021 1:10 PM   Willard Burgos is a 71 year old male who presents for:    Chief Complaint   Patient presents with     Blood Draw     Labs drawn via  by RN. VS taken.     Oncology Clinic Visit     New; CLL     Initial Vitals: BP (!) 140/71   Pulse 75   Temp 97.9  F (36.6  C) (Oral)   Resp 16   Wt 85 kg (187 lb 8 oz)   SpO2 96%   BMI 32.18 kg/m   Estimated body mass index is 32.18 kg/m  as calculated from the following:    Height as of 5/9/19: 1.626 m (5' 4\").    Weight as of this encounter: 85 kg (187 lb 8 oz). Body surface area is 1.96 meters squared.  No Pain (0) Comment: Data Unavailable   No LMP for male patient.  Allergies reviewed: Yes  Medications reviewed: Yes    Medications: Medication refills not needed today.  Pharmacy name entered into EPIC:    WRITTEN PRESCRIPTION REQUESTED  [id number] - [place] - [id number]    Clinical concerns: New pt consult.       Agnieszka Montenegro CMA              "

## 2021-10-25 NOTE — LETTER
10/25/2021         RE: Willard Burgos  [address]  [facility] [address]        Dear Colleague,    Thank you for referring your patient, Willard Burgos, to [facility]. Please see a copy of my visit note below.    Oncology/Hematology Visit Note  Oct 25, 2021    Reason for Visit: follow up of his CLL and prostate cancer    ASSESSMENT:  1. High risk, though still localized, prostate cancer, status post androgen deprivation and IMRT.  This is being managed by a urologist at [facility].  His most recent PSA is unmeasurable.  I am not actively managing this progress  2. Stage I chronic lymphocytic leukemia with a FISH panel showing trisomy 12, indicating moderate to low risk disease.  He has never had an indication for treatment.  Interestingly, with pelvic radiotherapy, his lymphocytosis normalized, indicating response to the pelvic lymph nodes to the radiation.  His CBC today looks very reassuring.  While I do not have a differential, his white count is not elevated.  3. 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  This implies to me that it was physiologic and I am not going to do any more ancillary testing.  4. Multiple polyps for which she needs continued serial colonoscopies.  5. 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  6. Not surprisingly, he does not have much of the antibody response to the Pfizer vaccine.  We know that CLL patients lacked disability.  Interestingly, after his most recent vaccine, he does have some titers.  We will just keep watching this without in the prevention.  7. Finally, it is impossible not to notice that now his wife has CLL to and they both live in a house full of radon.  They will do what they can to mitigate things.  We did do a brief literature review and there is some data to support the radon exposure does increase the risk of hematologic malignancy.        PLAN:  1. No intervention by  me  2. Continue follow-up with his urologist for androgen deprivation  3. Return to clinic with me in 6 months with a CBC as we continue in follow-up mode    Ashok Lewis MD        History of Present Illness:     DIAGNOSIS:  1. Stage I chronic lymphocytic leukemia with FISH panel showing trisomy 12, indicating moderate to low risk disease.  There is never been an indication for treatment for Ross.  2. Erythrocytosis that appears to be pathologic/physiologic.  Rest had a normal EPO level as well as an elevated carbon monoxide level.  A complete thorough work-up did not reveal a source of the carbon monoxide.  With observation this is all normalized, interestingly.  3. Clinical T2 cN0 M0 high risk prostate cancer, diagnosed at prostate biopsy 10/6/2020.  Rest presented with a PSA of 8.47 when it had previously been 2.09.  This was in March 2019.  Prostate MRI showed 2 separate lesions with a classification of 4 with high probability of clinically significant cancer.  There was some elevated enlarged lymph nodes consistent with his CLL.  Biopsy 10/6/2020 showed multiple areas of cancer bilaterally with the most concerning in the right lateral base because was a Elizabeth 4+4 = 8 and 25% of the core.  There was also a Elizabeth 8 and 50% of the core in the right lateral mid, and another [place] 8 in the left base and left lateral base.  The right base had 40% involvement with [place] 4+3.    THERAPY TO DATE:  1. We have never treated his CLL or his erythrocytosis  2. For his prostate cancer he had androgen deprivation initiated 11/24/2020 followed by IMRT 1/4 through 2/10/2021      Interval History:    Bob is back with Chelsey.  He gives me the very unusual news that Chelsey herself was just diagnosed with CLL.  They both wonder about the radon in their household and whether or not this is a risk.    After getting to Pfizer Covid vaccines, he did just get a Moderna as well.  He had no antibody titers prior to the  [place], but now he does have some.    He has not had any illness at all.  He denies cough or fever or shortness of breath.  He been doing a fair amount of traveling and are planning to go to [place] in a few months for a week of theater.    He is still getting androgen deprivation as part of his prostate cancer therapy.    The rest of the multiorgan review of physical symptoms is negative.    Social History:  Bob grew up in [place], [place], and graduated from high school in 1969.  He then went to college, majoring in math and business with a minor in creative writing.  He thought about studying theater, but apparently this was not allowed at his college.  Before graduating, he was excepted into the business masters program.  Upon graduation he was recruited by  [employer] and moved to [place].  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  He met his wife Chelsey while he was in [place].  A friend of Jeromes had been interested in Willard, but by the time he decided to approach her, Chelsey's friend was .  The friend gave him some names of single friends and when muscle called, Chelsey answered.  Chelsey's family has been living in [place] since the 1880s.  They have 2 daughters together.  Their older daughter has a PhD in biology as well as a law degree and works with [employer] in [place].  The younger daughter went to [employer] and got a job as a contractor dealing and foreign services based in [place].  Bob speaks 3 languages, English, bad Japanese, and bad Singaporean.    Review of Systems:  Patient denies any of the following except if noted above: fevers, chills, difficulty with energy, vision or hearing changes, chest pain, dyspnea, abdominal pain, nausea, vomiting, diarrhea, constipation, urinary concerns, headaches, numbness, tingling, issues with sleep or mood.  He/She also denies lumps, bumps, rashes or skin lesions, bleeding or bruising issues.    Current Outpatient Medications   Medication Sig Dispense Refill     Ascorbic Acid (VITAMIN C PO) Take 1,000 mg by mouth daily Takes with Vit E       ASPIRIN PO Take 81 mg by mouth daily       atenolol (TENORMIN) 50 MG tablet Take 25 mg by mouth       ATORVASTATIN CALCIUM PO Take 20 mg by mouth daily        febuxostat (ULORIC) 40 MG TABS tablet Take 40 mg by mouth       Fexofenadine HCl (ALLEGRA PO) Take 180 mg by mouth daily        multivitamin, therapeutic with minerals (MULTI-VITAMIN) TABS Take 1 tablet by mouth daily       OMEPRAZOLE PO Take 20 mg by mouth every morning       pantoprazole (PROTONIX) 40 MG EC tablet Take 40 mg by mouth       Solifenacin Succinate (VESICARE PO) Take 5 mg by mouth daily         Physical Examination:  General: The patient is a pleasant male in no acute distress.  There were no vitals taken for this visit.  Wt Readings from Last 10 Encounters:   05/09/19 83.5 kg (184 lb)   03/05/15 82.1 kg (180 lb 14.4 oz)   02/17/15 80.7 kg (178 lb)     Head: No concerning lesions  OP: No lesions  Neck: Supple, FROM  CV: S1 S2 RRR  Lungs: No dullness to percussion, CTA bilaterally  Abdomen: Bowel sounds present, soft, nontender with no palpable hepatosplenomegaly or masses.   Extremities: No lower extremity edema noted bilaterally.   Lymphatic: No cervical or supraclavicular axillary or inguinal adenopathy  Neuro: Cranial nerves II through XII are grossly intact.  Skin: No rashes, petechiae, or bruising noted on exposed skin.  Neuro: CN 2-12 grossly intact.    Laboratory Data:    WBC 9.4 (Diff pending)  Hgb 15.2  Plt 194  CMP normal  Most Recent 3 CBC's:No lab results found. Most Recent 3 BMP's:No lab results found. Most Recent 2 LFT's:No lab results found. Most Recent TSH and T4:No lab results found.  I reviewed the above labs today.    Imaging:      I reviewed the above imaging today.      Ashok Lewis MD,  Msc  [facility] [place] [place] [address]  [phone]    40 minutes spent on the date of the encounter doing chart review, review of outside records, review of test results, interpretation of tests, patient visit and documentation       Again, thank you for allowing me to participate in the care of your patient.        Sincerely,        Ashok Lewis MD

## 2021-10-26 LAB
IGA SERPL-MCNC: 105 MG/DL (ref 84–499)
IGG SERPL-MCNC: 788 MG/DL (ref 610–1616)
IGM SERPL-MCNC: 105 MG/DL (ref 35–242)

## 2021-11-07 ENCOUNTER — HEALTH MAINTENANCE LETTER (OUTPATIENT)
Age: 71
End: 2021-11-07

## 2022-04-19 NOTE — PROGRESS NOTES
[facility] Medical Oncology Note          April 21, 2022    Outpatient Progress Note      Assessment:     1. High risk, though still localized, prostate cancer, status post androgen deprivation and IMRT.  This is being managed by a urologist at [facility].  His most recent PSA is unmeasurable.  I am not actively managing this progress  2. Stage I chronic lymphocytic leukemia with a FISH panel showing trisomy 12, indicating moderate to low risk disease.  He has never had an indication for treatment.  Interestingly, with pelvic radiotherapy, his lymphocytosis normalized, indicating response to the pelvic lymph nodes to the radiation.  His CBC today looks perfect.  Moreover, he has been seen at the [facility] ER and tells me he wants to get his care there for his CLL.  I completely get that.  Therefore the truth is there is no real reason for rush to follow-up with me in the future.  He tells me he wants to see me in 1 year just to let me know how things are going on and of course that is reasonable.  3. 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  This implies to me that it was physiologic and I am not going to do any more ancillary testing.  4. Multiple polyps for which she needs continued serial colonoscopies.  5. 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      Plan:       1. No intervention by me  2. Continue follow-up with his urologist for androgen deprivation  3. Continue follow-up with the [facility] ER for his CLL  4. Return to clinic with me on an as-needed basis.      What a delight it has been to take care of this very nice man.        Ashok Lewis MD, MSc  Associate Professor of Medicine  [employer] [facility] [employer]  [facility]  [address] [place] [id number]  [phone]    __________________________________________________________________    Diagnosis     DIAGNOSIS:  1. Stage I chronic lymphocytic leukemia with FISH  panel showing trisomy 12, indicating moderate to low risk disease.  There is never been an indication for treatment for Andrew.  2. Erythrocytosis that appears to be pathologic/physiologic.  Bob had a normal EPO level as well as an elevated carbon monoxide level.  A complete thorough work-up did not reveal a source of the carbon monoxide.  With observation this is all normalized, interestingly.  3. Clinical T2 cN0 M0 high risk prostate cancer, diagnosed at prostate biopsy 10/6/2020.  Rest presented with a PSA of 8.47 when it had previously been 2.09.  This was in March 2019.  Prostate MRI showed 2 separate lesions with a classification of 4 with high probability of clinically significant cancer.  There was some elevated enlarged lymph nodes consistent with his CLL.  Biopsy 10/6/2020 showed multiple areas of cancer bilaterally with the most concerning in the right lateral base because was a Elizabeth 4+4 = 8 and 25% of the core.  There was also a Empire 8 and 50% of the core in the right lateral mid, and another Elizabeth 8 in the left base and left lateral base.  The right base had 40% involvement with Elizabeth 4+3.        History of Present Illness/Therapy to Date:     1. We have never treated his CLL or his erythrocytosis  2. For his prostate cancer he had androgen deprivation initiated 11/24/2020 followed by IMRT 1/4 through 2/10/2021      Interval history:     Bob is back and feeling well.    He has no new lumps or bumps.    He is now getting his care for CLL at the [facility].  [facility].    He and his wife have not series of trips planned.    He has no recent infections.    His appetite and energy level are great.    Multiple other psychosocial stressors were discussed at length    The rest of a multiorgan review of physical symptoms is negative.          Past Medical History:     Past Medical History:   Diagnosis Date     Adenomatous polyp      Angioedema      Travis's oesophagus with  dysplasia      CLL (chronic lymphocytic leukemia) (H) 2019    stage at dx Wilkes 0, CD 20 positive 11q del unkown, 17p del unknown     Gastro-oesophageal reflux disease      Hearing loss     mild     Hyperuricemia      Psoriasis      Renal disease     Borderline renal insufficiency; kidney stones     Tinnitus      Vocal cord granuloma           Past Surgical History:         Social History:   Tobacco, ETOH, and rec drugs reviewed and as noted below with the following exceptions:  Bob grew up in [place], [place], and graduated from high school in .  He then went to college, majoring in math and business with a minor in creative writing.  He thought about studying theater, but apparently this was not allowed at his college.  Before graduating, he was excepted into the business masters program.  Upon graduation he was recruited by [employer] and moved to [place].  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  He met his wife Chelsey while he was in [place].  A friend of Jeromes had been interested in Willard, but by the time he decided to approach her, Chelsey's friend was .  The friend gave him some names of single friends and when muscle called, Chelsey answered.  Chelsey's family has been living in [place] since the s.  They have 2 daughters together.  Their older daughter has a PhD in biology as well as a law degree and works with [email or web address] in [place].  The younger daughter went to [employer] and got a job as a contractor dealing and foreign services based in [place].  Bob speaks 3 languages, English, bad Papua [place]inean, and bad Andorran. Chelsey, interestingly, was also diagnosed with CLL recently.          Family History:     Family History   Problem Relation Age of Onset     Coronary Artery Disease Mother          dementia age 96     Hypertension Father           kidney failure age 94     Cerebrovascular Disease Father      Gallbladder Disease Father      Snoring Father      Prostate Cancer Father 70        skin ca 50     Coronary Artery Disease Paternal Grandfather      Alcoholism Brother             Medications:     Current Outpatient Medications   Medication Sig Dispense Refill     Ascorbic Acid (VITAMIN C PO) Take 1,000 mg by mouth daily Takes with Vit E       ASPIRIN PO Take 81 mg by mouth daily       atenolol (TENORMIN) 50 MG tablet Take 25 mg by mouth       ATORVASTATIN CALCIUM PO Take 20 mg by mouth daily        febuxostat (ULORIC) 40 MG TABS tablet Take 40 mg by mouth       Fexofenadine HCl (ALLEGRA PO) Take 180 mg by mouth daily        multivitamin, therapeutic with minerals (MULTI-VITAMIN) TABS Take 1 tablet by mouth daily       OMEPRAZOLE PO Take 20 mg by mouth every morning       pantoprazole (PROTONIX) 40 MG EC tablet Take 40 mg by mouth Alternates with Omeprazole       Solifenacin Succinate (VESICARE PO) Take 5 mg by mouth daily       tamsulosin (FLOMAX) 0.4 MG capsule        VASCEPA 1 g CAPS Take 2 g by mouth 2 times daily                Physical Exam:   There were no vitals taken for this visit.    ECOG PS: 0  Constitutional: WDWN male in NAD, pleasant and appropriate  HEENT:  NC/AT, no icterus, OP clear, MMM  Skin: No jaundice nor ecchymoses  Lungs: CTAB, no w/r/r, nonlabored breathing  Cardiovascular: RRR, S1, S2, no m/r/g  Abdomen: +BS, soft, nontender, nondistended, no organomegaly nor masses  MSK/Extremities: Warm, well perfused. No edema  LN: no cervical, supraclavicular, axillary, nor inguinal lymphadenopathy  Neurologic: alert, answering questions appropriately, moving all extremities spontaneously. CN 2-12 grossly intact.  Psych: appropriate affect  Data:     Recent Labs   Lab Test 10/25/21  1258   WBC 9.4   NEUTROPHIL 45   HGB 15.2        Recent Labs   Lab Test 10/25/21  1258      POTASSIUM 4.2   CHLORIDE 104   CO2 29   ANIONGAP 6    BUN 23   CR 1.00   TYRON 9.5     No results for input(s): MAG, PHOS, LDH, URIC in the last 00275 hours.  Recent Labs   Lab Test 10/25/21  1258   BILITOTAL 0.7   ALKPHOS 92   ALT 38   AST 29   ALBUMIN 3.9       No results found for this or any previous visit (from the past 24 hour(s)).    Other data           Labs, imaging and treatment plan reviewed with patient. All questions answered.        25 minutes spent on the date of the encounter doing chart review, review of outside records, review of test results, interpretation of tests, patient visit and documentation

## 2022-04-21 ENCOUNTER — LAB (OUTPATIENT)
Dept: LAB | Facility: CLINIC | Age: 72
End: 2022-04-21
Attending: INTERNAL MEDICINE
Payer: MEDICARE

## 2022-04-21 ENCOUNTER — ONCOLOGY VISIT (OUTPATIENT)
Dept: ONCOLOGY | Facility: CLINIC | Age: 72
End: 2022-04-21
Attending: INTERNAL MEDICINE
Payer: MEDICARE

## 2022-04-21 VITALS
RESPIRATION RATE: 16 BRPM | DIASTOLIC BLOOD PRESSURE: 80 MMHG | OXYGEN SATURATION: 96 % | WEIGHT: 188.1 LBS | BODY MASS INDEX: 32.29 KG/M2 | TEMPERATURE: 98.1 F | HEART RATE: 74 BPM | SYSTOLIC BLOOD PRESSURE: 124 MMHG

## 2022-04-21 DIAGNOSIS — C91.10 CLL (CHRONIC LYMPHOCYTIC LEUKEMIA) (H): Primary | ICD-10-CM

## 2022-04-21 DIAGNOSIS — C91.10 CLL (CHRONIC LYMPHOCYTIC LEUKEMIA) (H): ICD-10-CM

## 2022-04-21 LAB
ALBUMIN SERPL-MCNC: 3.7 G/DL (ref 3.4–5)
ALP SERPL-CCNC: 100 U/L (ref 40–150)
ALT SERPL W P-5'-P-CCNC: 34 U/L (ref 0–70)
ANION GAP SERPL CALCULATED.3IONS-SCNC: 8 MMOL/L (ref 3–14)
AST SERPL W P-5'-P-CCNC: 28 U/L (ref 0–45)
BASOPHILS # BLD MANUAL: 0.1 10E3/UL (ref 0–0.2)
BASOPHILS NFR BLD MANUAL: 1 %
BILIRUB SERPL-MCNC: 0.7 MG/DL (ref 0.2–1.3)
BUN SERPL-MCNC: 24 MG/DL (ref 7–30)
CALCIUM SERPL-MCNC: 9.4 MG/DL (ref 8.5–10.1)
CHLORIDE BLD-SCNC: 109 MMOL/L (ref 94–109)
CO2 SERPL-SCNC: 26 MMOL/L (ref 20–32)
CREAT SERPL-MCNC: 1.07 MG/DL (ref 0.66–1.25)
EOSINOPHIL # BLD MANUAL: 0 10E3/UL (ref 0–0.7)
EOSINOPHIL NFR BLD MANUAL: 0 %
ERYTHROCYTE [DISTWIDTH] IN BLOOD BY AUTOMATED COUNT: 13.1 % (ref 10–15)
GFR SERPL CREATININE-BSD FRML MDRD: 75 ML/MIN/1.73M2
GLUCOSE BLD-MCNC: 139 MG/DL (ref 70–99)
HCT VFR BLD AUTO: 45.5 % (ref 40–53)
HGB BLD-MCNC: 14.9 G/DL (ref 13.3–17.7)
LYMPHOCYTES # BLD MANUAL: 4.3 10E3/UL (ref 0.8–5.3)
LYMPHOCYTES NFR BLD MANUAL: 46 %
MCH RBC QN AUTO: 29.1 PG (ref 26.5–33)
MCHC RBC AUTO-ENTMCNC: 32.7 G/DL (ref 31.5–36.5)
MCV RBC AUTO: 89 FL (ref 78–100)
MONOCYTES # BLD MANUAL: 1 10E3/UL (ref 0–1.3)
MONOCYTES NFR BLD MANUAL: 11 %
NEUTROPHILS # BLD MANUAL: 3.9 10E3/UL (ref 1.6–8.3)
NEUTROPHILS NFR BLD MANUAL: 42 %
PLAT MORPH BLD: ABNORMAL
PLATELET # BLD AUTO: 178 10E3/UL (ref 150–450)
POTASSIUM BLD-SCNC: 4.2 MMOL/L (ref 3.4–5.3)
PROT SERPL-MCNC: 6.9 G/DL (ref 6.8–8.8)
RBC # BLD AUTO: 5.12 10E6/UL (ref 4.4–5.9)
RBC MORPH BLD: ABNORMAL
SMUDGE CELLS BLD QL SMEAR: PRESENT
SODIUM SERPL-SCNC: 143 MMOL/L (ref 133–144)
WBC # BLD AUTO: 9.3 10E3/UL (ref 4–11)

## 2022-04-21 PROCEDURE — 36415 COLL VENOUS BLD VENIPUNCTURE: CPT | Performed by: PATHOLOGY

## 2022-04-21 PROCEDURE — G0463 HOSPITAL OUTPT CLINIC VISIT: HCPCS

## 2022-04-21 PROCEDURE — 80053 COMPREHEN METABOLIC PANEL: CPT | Performed by: PATHOLOGY

## 2022-04-21 PROCEDURE — 99213 OFFICE O/P EST LOW 20 MIN: CPT | Performed by: INTERNAL MEDICINE

## 2022-04-21 PROCEDURE — 85027 COMPLETE CBC AUTOMATED: CPT | Performed by: PATHOLOGY

## 2022-04-21 PROCEDURE — 82784 ASSAY IGA/IGD/IGG/IGM EACH: CPT | Performed by: INTERNAL MEDICINE

## 2022-04-21 ASSESSMENT — PAIN SCALES - GENERAL: PAINLEVEL: NO PAIN (0)

## 2022-04-21 NOTE — LETTER
4/21/2022         RE: Willard Burgos  [address]  [facility] [address]        Dear Colleague,    Thank you for referring your patient, Willard Burgos, to the [facility]. Please see a copy of my visit note below.      [facility] Medical Oncology Note          April 21, 2022    Outpatient Progress Note      Assessment:     1. High risk, though still localized, prostate cancer, status post androgen deprivation and IMRT.  This is being managed by a urologist at [facility].  His most recent PSA is unmeasurable.  I am not actively managing this progress  2. Stage I chronic lymphocytic leukemia with a FISH panel showing trisomy 12, indicating moderate to low risk disease.  He has never had an indication for treatment.  Interestingly, with pelvic radiotherapy, his lymphocytosis normalized, indicating response to the pelvic lymph nodes to the radiation.  His CBC today looks perfect.  Moreover, he has been seen at the [facility] and tells me he wants to get his care there for his CLL.  I completely get that.  Therefore the truth is there is no real reason for rush to follow-up with me in the future.  He tells me he wants to see me in 1 year just to let me know how things are going on and of course that is reasonable.  3. 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  This implies to me that it was physiologic and I am not going to do any more ancillary testing.  4. Multiple polyps for which she needs continued serial colonoscopies.  5. 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      Plan:       1. No intervention by me  2. Continue follow-up with his urologist for androgen deprivation  3. Continue follow-up with the [facility] for his CLL  4. Return to clinic with me on an as-needed basis.      What a delight it has been to take care of this very nice man.        Ashok Lewis MD, MSc  Associate Professor of Medicine  [facility]  [facility]  [facility]  [address] [place] [id number]  [phone]    __________________________________________________________________    Diagnosis     DIAGNOSIS:  1. Stage I chronic lymphocytic leukemia with FISH panel showing trisomy 12, indicating moderate to low risk disease.  There is never been an indication for treatment for Ross.  2. Erythrocytosis that appears to be pathologic/physiologic.  Bob had a normal EPO level as well as an elevated carbon monoxide level.  A complete thorough work-up did not reveal a source of the carbon monoxide.  With observation this is all normalized, interestingly.  3. Clinical T2 cN0 M0 high risk prostate cancer, diagnosed at prostate biopsy 10/6/2020.  Rest presented with a PSA of 8.47 when it had previously been 2.09.  This was in March 2019.  Prostate MRI showed 2 separate lesions with a classification of 4 with high probability of clinically significant cancer.  There was some elevated enlarged lymph nodes consistent with his CLL.  Biopsy 10/6/2020 showed multiple areas of cancer bilaterally with the most concerning in the right lateral base because was a Columbus 4+4 = 8 and 25% of the core.  There was also a Elizabeth 8 and 50% of the core in the right lateral mid, and another Elizabeth 8 in the left base and left lateral base.  The right base had 40% involvement with Columbus 4+3.        History of Present Illness/Therapy to Date:     1. We have never treated his CLL or his erythrocytosis  2. For his prostate cancer he had androgen deprivation initiated 11/24/2020 followed by IMRT 1/4 through 2/10/2021      Interval history:     Bob is back and feeling well.    He has no new lumps or bumps.    He is now getting his care for CLL at the [facility].  He did receive of [employer] there.    He and his wife have not series of trips planned.    He has no recent infections.    His appetite and energy level are great.    Multiple other  psychosocial stressors were discussed at length    The rest of a multiorgan review of physical symptoms is negative.          Past Medical History:     Past Medical History:   Diagnosis Date     Adenomatous polyp      Angioedema      Travis's oesophagus with dysplasia      CLL (chronic lymphocytic leukemia) (H) 03/22/2019    stage at dx Wilkes 0, CD 20 positive 11q del unkown, 17p del unknown     Gastro-oesophageal reflux disease      Hearing loss     mild     Hyperuricemia      Psoriasis      Renal disease     Borderline renal insufficiency; kidney stones     Tinnitus      Vocal cord granuloma           Past Surgical History:         Social History:   Tobacco, ETOH, and rec drugs reviewed and as noted below with the following exceptions:  Bob grew up in [place], [place], and graduated from high school in 1969.  He then went to college, majoring in math and business with a minor in creative writing.  He thought about studying theater, but apparently this was not allowed at his college.  Before graduating, he was excepted into the business masters program.  Upon graduation he was recruited by [employer] and moved to [place].  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  He met his wife Chelsey while he was in [place].  A friend of Jeromes had been interested in Willard, but by the time he decided to approach her, Chelsey's friend was .  The friend gave him some names of single friends and when muscle called, Chelsey answered.  Chelsey's family has been living in [place] since the 1880s.  They have 2 daughters together.  Their older daughter has a PhD in biology as well as a law degree and works with [employer] in [place].  The younger daughter went to [employer] and got a job as a contractor dealing and foreign services based in [place].  Bob speaks 3 languages, English, bad  Greek, and bad Nigerien. Chelsey, interestingly, was also diagnosed with CLL recently.          Family History:     Family History   Problem Relation Age of Onset     Coronary Artery Disease Mother          dementia age 96     Hypertension Father          kidney failure age 94     Cerebrovascular Disease Father      Gallbladder Disease Father      Snoring Father      Prostate Cancer Father 70        skin ca 50     Coronary Artery Disease Paternal Grandfather      Alcoholism Brother             Medications:     Current Outpatient Medications   Medication Sig Dispense Refill     Ascorbic Acid (VITAMIN C PO) Take 1,000 mg by mouth daily Takes with Vit E       ASPIRIN PO Take 81 mg by mouth daily       atenolol (TENORMIN) 50 MG tablet Take 25 mg by mouth       ATORVASTATIN CALCIUM PO Take 20 mg by mouth daily        febuxostat (ULORIC) 40 MG TABS tablet Take 40 mg by mouth       Fexofenadine HCl (ALLEGRA PO) Take 180 mg by mouth daily        multivitamin, therapeutic with minerals (MULTI-VITAMIN) TABS Take 1 tablet by mouth daily       OMEPRAZOLE PO Take 20 mg by mouth every morning       pantoprazole (PROTONIX) 40 MG EC tablet Take 40 mg by mouth Alternates with Omeprazole       Solifenacin Succinate (VESICARE PO) Take 5 mg by mouth daily       tamsulosin (FLOMAX) 0.4 MG capsule        VASCEPA 1 g CAPS Take 2 g by mouth 2 times daily                Physical Exam:   There were no vitals taken for this visit.    ECOG PS: 0  Constitutional: WDWN male in NAD, pleasant and appropriate  HEENT:  NC/AT, no icterus, OP clear, MMM  Skin: No jaundice nor ecchymoses  Lungs: CTAB, no w/r/r, nonlabored breathing  Cardiovascular: RRR, S1, S2, no m/r/g  Abdomen: +BS, soft, nontender, nondistended, no organomegaly nor masses  MSK/Extremities: Warm, well perfused. No edema  LN: no cervical, supraclavicular, axillary, nor inguinal lymphadenopathy  Neurologic: alert, answering questions appropriately, moving all extremities  spontaneously. CN 2-12 grossly intact.  Psych: appropriate affect  Data:     Recent Labs   Lab Test 10/25/21  1258   WBC 9.4   NEUTROPHIL 45   HGB 15.2        Recent Labs   Lab Test 10/25/21  1258      POTASSIUM 4.2   CHLORIDE 104   CO2 29   ANIONGAP 6   BUN 23   CR 1.00   TYRON 9.5     No results for input(s): MAG, PHOS, LDH, URIC in the last 43913 hours.  Recent Labs   Lab Test 10/25/21  1258   BILITOTAL 0.7   ALKPHOS 92   ALT 38   AST 29   ALBUMIN 3.9       No results found for this or any previous visit (from the past 24 hour(s)).    Other data       Labs, imaging and treatment plan reviewed with patient. All questions answered.    25 minutes spent on the date of the encounter doing chart review, review of outside records, review of test results, interpretation of tests, patient visit and documentation       Again, thank you for allowing me to participate in the care of your patient.      Sincerely,    Ashok Lewis MD

## 2022-04-21 NOTE — NURSING NOTE
"Oncology Rooming Note    April 21, 2022 10:54 AM   Willard Burgos is a 71 year old male who presents for:    Chief Complaint   Patient presents with     Oncology Clinic Visit     CLL (chronic lymphocytic leukemia)      Initial Vitals: /80 (BP Location: Left arm, Patient Position: Sitting, Cuff Size: Adult Regular)   Pulse 74   Temp 98.1  F (36.7  C) (Oral)   Resp 16   Wt 85.3 kg (188 lb 1.6 oz)   SpO2 96%   BMI 32.29 kg/m   Estimated body mass index is 32.29 kg/m  as calculated from the following:    Height as of 5/9/19: 1.626 m (5' 4\").    Weight as of this encounter: 85.3 kg (188 lb 1.6 oz). Body surface area is 1.96 meters squared.  No Pain (0) Comment: Data Unavailable   No LMP for male patient.  Allergies reviewed: Yes  Medications reviewed: Yes    Medications: Medication refills not needed today.  Pharmacy name entered into EPIC:    WRITTEN PRESCRIPTION REQUESTED  [id number] - [place] - [id number]    Clinical concerns: No major changes reported.        Jodie Borges LPN April 21, 2022 10:55 AM                "

## 2022-04-22 LAB
IGA SERPL-MCNC: 91 MG/DL (ref 84–499)
IGG SERPL-MCNC: 712 MG/DL (ref 610–1616)
IGM SERPL-MCNC: 83 MG/DL (ref 35–242)

## 2022-10-30 ENCOUNTER — HEALTH MAINTENANCE LETTER (OUTPATIENT)
Age: 72
End: 2022-10-30

## 2023-04-11 DIAGNOSIS — C91.10 CLL (CHRONIC LYMPHOCYTIC LEUKEMIA) (H): Primary | ICD-10-CM

## 2023-04-11 DIAGNOSIS — C61 PROSTATE CANCER (H): ICD-10-CM

## 2023-04-11 NOTE — PROGRESS NOTES
[facility] Medical Oncology Note  April 13, 2023  Outpatient Progress Note      Assessment:     1. High risk, though still localized, prostate cancer, status post androgen deprivation and IMRT.  This is being managed by a urologist at [facility].  His most recent PSA is unmeasurable. I am not actively managing this progress  2. Stage I chronic lymphocytic leukemia with a FISH panel showing trisomy 12, indicating moderate to low risk disease.  He has never had an indication for treatment.  Interestingly, with pelvic radiotherapy, his lymphocytosis normalized, indicating response to the pelvic lymph nodes to the radiation.  His CBC today looks perfect.  Moreover, he has been seen at the [facility] and tells me he wants to get his care there for his CLL.  I completely get that.  Therefore the truth is there is no real reason for rush to follow-up with me in the future.  He tells me he wants to see me in 1 year just to let me know how things are going on and of course that is reasonable.  3. 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  This implies to me that it was physiologic and I am not going to do any more ancillary testing.  4. Multiple polyps for which she needs continued serial colonoscopies.  5. 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  6. Otherwise healthy and fun person.      Plan:       1. No intervention by me  2. Continue follow-up with his urologist for androgen deprivation  3. Continue follow-up with the [facility] for his CLL  4. Return to clinic with me on an as-needed basis.      What a delight it has been to take care of this very nice man.        Ashok Lewis MD, MSc  Associate Professor of Medicine  [employer] [facility]  [facility]  [address] [place] [id number]  [phone]    __________________________________________________________________    Diagnosis     DIAGNOSIS:  1. Stage I chronic  lymphocytic leukemia with FISH panel showing trisomy 12, indicating moderate to low risk disease.  There is never been an indication for treatment for Andrew.  2. Erythrocytosis that appears to be pathologic/physiologic.  Bob had a normal EPO level as well as an elevated carbon monoxide level.  A complete thorough work-up did not reveal a source of the carbon monoxide.  With observation this is all normalized, interestingly.  3. Clinical T2 cN0 M0 high risk prostate cancer, diagnosed at prostate biopsy 10/6/2020.  Rest presented with a PSA of 8.47 when it had previously been 2.09.  This was in March 2019.  Prostate MRI showed 2 separate lesions with a classification of 4 with high probability of clinically significant cancer.  There was some elevated enlarged lymph nodes consistent with his CLL.  Biopsy 10/6/2020 showed multiple areas of cancer bilaterally with the most concerning in the right lateral base because was a Elizabeth 4+4 = 8 and 25% of the core.  There was also a Elizabeth 8 and 50% of the core in the right lateral mid, and another [place] 8 in the left base and left lateral base.  The right base had 40% involvement with Elizabeth 4+3.        History of Present Illness/Therapy to Date:     1. We have never treated his CLL or his erythrocytosis  2. For his prostate cancer he had androgen deprivation initiated 11/24/2020 followed by IMRT 1/4 through 2/10/2021      Interval history:     Bob is back and feeling well.    He is now getting his care for CLL at the [facility].  He did receive Evushield there a year ago. OR IS HE????    He tells me he still benefits from our appointments    He feels well.    He and his wife have more trips planned.    He has no recent infections.    His appetite and energy level are great.    Multiple other psychosocial stressors were discussed at length    The rest of a multiorgan review of physical symptoms is negative.          Past Medical History:     Past Medical History:    Diagnosis Date     Adenomatous polyp      Angioedema      Travis's oesophagus with dysplasia      CLL (chronic lymphocytic leukemia) (H) 03/22/2019    stage at dx Wilkes 0, CD 20 positive 11q del unkown, 17p del unknown     Gastro-oesophageal reflux disease      Hearing loss     mild     Hyperuricemia      Psoriasis      Renal disease     Borderline renal insufficiency; kidney stones     Tinnitus      Vocal cord granuloma           Past Surgical History:         Social History:   Tobacco, ETOH, and rec drugs reviewed and as noted below with the following exceptions:  Bob grew up in [place], [place], and graduated from high school in 1969.  He then went to college, majoring in math and business with a minor in creative writing.  He thought about studying theater, but apparently this was not allowed at his college.  Before graduating, he was excepted into the business masters program.  Upon graduation he was recruited by [employer] and moved to [place].  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  He met his wife Chelsey while he was in [place].  A friend of Jeromes had been interested in Willard, but by the time he decided to approach her, Chelsey's friend was .  The friend gave him some names of single friends and when muscle called, Chelsey answered.  Chelsey's family has been living in [place] since the 1880s.  They have 2 daughters together.  Their older daughter has a PhD in biology as well as a law degree and works with [employer] in [place].  The younger daughter went to [employer] and got a job as a contractor dealing and foreign services based in [place].  Bob speaks 3 languages, English, bad Zimbabwean, and bad Yemeni. Chelsey, interestingly, was also diagnosed with CLL recently.          Family History:     Family History   Problem Relation Age of Onset     Coronary Artery  Disease Mother          dementia age 96     Hypertension Father          kidney failure age 94     Cerebrovascular Disease Father      Gallbladder Disease Father      Snoring Father      Prostate Cancer Father 70        skin ca 50     Coronary Artery Disease Paternal Grandfather      Alcoholism Brother             Medications:     Current Outpatient Medications   Medication Sig Dispense Refill     Ascorbic Acid (VITAMIN C PO) Take 1,000 mg by mouth daily Takes with Vit E       ASPIRIN PO Take 81 mg by mouth daily       atenolol (TENORMIN) 50 MG tablet Take 25 mg by mouth       ATORVASTATIN CALCIUM PO Take 20 mg by mouth daily        febuxostat (ULORIC) 40 MG TABS tablet Take 40 mg by mouth       Fexofenadine HCl (ALLEGRA PO) Take 180 mg by mouth daily        multivitamin, therapeutic with minerals (MULTI-VITAMIN) TABS Take 1 tablet by mouth daily       OMEPRAZOLE PO Take 20 mg by mouth every morning       pantoprazole (PROTONIX) 40 MG EC tablet Take 40 mg by mouth Alternates with Omeprazole       Solifenacin Succinate (VESICARE PO) Take 5 mg by mouth daily       tamsulosin (FLOMAX) 0.4 MG capsule        VASCEPA 1 g CAPS Take 2 g by mouth 2 times daily                Physical Exam:   There were no vitals taken for this visit.    ECOG PS: 0  Constitutional: WDWN male in NAD, pleasant and appropriate  HEENT:  NC/AT, no icterus, OP clear, MMM  Skin: No jaundice nor ecchymoses  Lungs: CTAB, no w/r/r, nonlabored breathing  Cardiovascular: RRR, S1, S2, no m/r/g  Abdomen: +BS, soft, nontender, nondistended, no organomegaly nor masses  MSK/Extremities: Warm, well perfused. No edema  LN: no cervical, supraclavicular, axillary, nor inguinal lymphadenopathy  Neurologic: alert, answering questions appropriately, moving all extremities spontaneously. CN 2-12 grossly intact.  Psych: appropriate affect  Data:     Recent Labs   Lab Test 10/25/21  1258   WBC 9.4   NEUTROPHIL 45   HGB 15.2        Recent Labs   Lab Test  10/25/21  1258      POTASSIUM 4.2   CHLORIDE 104   CO2 29   ANIONGAP 6   BUN 23   CR 1.00   TYRON 9.5     No results for input(s): MAG, PHOS, LDH, URIC in the last 37163 hours.  Recent Labs   Lab Test 10/25/21  1258   BILITOTAL 0.7   ALKPHOS 92   ALT 38   AST 29   ALBUMIN 3.9       No results found for this or any previous visit (from the past 24 hour(s)).    Other data           Labs, imaging and treatment plan reviewed with patient. All questions answered.        25 minutes spent on the date of the encounter doing chart review, review of outside records, review of test results, interpretation of tests, patient visit and documentation

## 2023-04-13 ENCOUNTER — ONCOLOGY VISIT (OUTPATIENT)
Dept: ONCOLOGY | Facility: CLINIC | Age: 73
End: 2023-04-13
Attending: INTERNAL MEDICINE
Payer: MEDICARE

## 2023-04-13 ENCOUNTER — APPOINTMENT (OUTPATIENT)
Dept: LAB | Facility: CLINIC | Age: 73
End: 2023-04-13
Attending: INTERNAL MEDICINE
Payer: MEDICARE

## 2023-04-13 VITALS
RESPIRATION RATE: 16 BRPM | TEMPERATURE: 98 F | BODY MASS INDEX: 31.93 KG/M2 | OXYGEN SATURATION: 98 % | HEART RATE: 61 BPM | SYSTOLIC BLOOD PRESSURE: 128 MMHG | WEIGHT: 186 LBS | DIASTOLIC BLOOD PRESSURE: 76 MMHG

## 2023-04-13 DIAGNOSIS — C61 PROSTATE CANCER (H): ICD-10-CM

## 2023-04-13 DIAGNOSIS — C91.10 CLL (CHRONIC LYMPHOCYTIC LEUKEMIA) (H): ICD-10-CM

## 2023-04-13 LAB
BASOPHILS # BLD MANUAL: 0 10E3/UL (ref 0–0.2)
BASOPHILS NFR BLD MANUAL: 0 %
EOSINOPHIL # BLD MANUAL: 0.6 10E3/UL (ref 0–0.7)
EOSINOPHIL NFR BLD MANUAL: 4 %
ERYTHROCYTE [DISTWIDTH] IN BLOOD BY AUTOMATED COUNT: 13.2 % (ref 10–15)
HCT VFR BLD AUTO: 44.7 % (ref 40–53)
HGB BLD-MCNC: 15.2 G/DL (ref 13.3–17.7)
LYMPHOCYTES # BLD MANUAL: 9 10E3/UL (ref 0.8–5.3)
LYMPHOCYTES NFR BLD MANUAL: 65 %
MCH RBC QN AUTO: 30.2 PG (ref 26.5–33)
MCHC RBC AUTO-ENTMCNC: 34 G/DL (ref 31.5–36.5)
MCV RBC AUTO: 89 FL (ref 78–100)
MONOCYTES # BLD MANUAL: 1.5 10E3/UL (ref 0–1.3)
MONOCYTES NFR BLD MANUAL: 11 %
NEUTROPHILS # BLD MANUAL: 2.8 10E3/UL (ref 1.6–8.3)
NEUTROPHILS NFR BLD MANUAL: 20 %
PLAT MORPH BLD: ABNORMAL
PLATELET # BLD AUTO: 192 10E3/UL (ref 150–450)
PSA SERPL DL<=0.01 NG/ML-MCNC: <0.01 NG/ML (ref 0–6.5)
RBC # BLD AUTO: 5.03 10E6/UL (ref 4.4–5.9)
RBC MORPH BLD: ABNORMAL
WBC # BLD AUTO: 13.8 10E3/UL (ref 4–11)

## 2023-04-13 PROCEDURE — G0463 HOSPITAL OUTPT CLINIC VISIT: HCPCS | Performed by: INTERNAL MEDICINE

## 2023-04-13 PROCEDURE — 36415 COLL VENOUS BLD VENIPUNCTURE: CPT | Performed by: INTERNAL MEDICINE

## 2023-04-13 PROCEDURE — 85007 BL SMEAR W/DIFF WBC COUNT: CPT | Performed by: INTERNAL MEDICINE

## 2023-04-13 PROCEDURE — 84153 ASSAY OF PSA TOTAL: CPT | Performed by: INTERNAL MEDICINE

## 2023-04-13 PROCEDURE — 99214 OFFICE O/P EST MOD 30 MIN: CPT | Performed by: INTERNAL MEDICINE

## 2023-04-13 PROCEDURE — 85018 HEMOGLOBIN: CPT | Performed by: INTERNAL MEDICINE

## 2023-04-13 RX ORDER — IBUPROFEN 400 MG/1
1 TABLET, FILM COATED ORAL DAILY PRN
COMMUNITY
Start: 2021-07-07

## 2023-04-13 RX ORDER — BETAMETHASONE DIPROPIONATE 0.5 MG/G
OINTMENT, AUGMENTED TOPICAL PRN
COMMUNITY

## 2023-04-13 RX ORDER — IBUPROFEN 200 MG
CAPSULE ORAL DAILY
COMMUNITY
Start: 2021-07-07

## 2023-04-13 RX ORDER — LEUPROLIDE ACETATE 22.5 MG
22.5 KIT SUBCUTANEOUS
COMMUNITY

## 2023-04-13 RX ORDER — DESONIDE 0.5 MG/G
OINTMENT TOPICAL
COMMUNITY
Start: 2023-02-02

## 2023-04-13 ASSESSMENT — PAIN SCALES - GENERAL: PAINLEVEL: NO PAIN (0)

## 2023-04-13 NOTE — NURSING NOTE
Chief Complaint   Patient presents with     Blood Draw     Labs drawn by RN via , vitals taken     Labs collected from venipuncture by RN. Vitals taken. Checked in for appointment(s).    Deana Esteves RN

## 2023-04-13 NOTE — LETTER
4/13/2023         RE: Willard Burgos  [address]  [facility] [address]        Dear Colleague,    Thank you for referring your patient, Willard Burgos, to the [facility]. Please see a copy of my visit note below.      [facility] Medical Oncology Note  April 13, 2023  Outpatient Progress Note      Assessment:     High risk, though still localized, prostate cancer, status post androgen deprivation and IMRT.  This is being managed by a urologist at [facility].  His most recent PSA is unmeasurable. I am not actively managing this progress  Stage I chronic lymphocytic leukemia with a FISH panel showing trisomy 12, indicating moderate to low risk disease.  He has never had an indication for treatment.  Interestingly, with pelvic radiotherapy, his lymphocytosis normalized, indicating response to the pelvic lymph nodes to the radiation.  His CBC today looks perfect.  Moreover, he has been seen at the [facility] and tells me he wants to get his care there for his CLL.  I completely get that.  Therefore the truth is there is no real reason for rush to follow-up with me in the future.  He tells me he wants to see me in 1 year just to let me know how things are going on and of course that is reasonable.  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  This implies to me that it was physiologic and I am not going to do any more ancillary testing.  Multiple polyps for which she needs continued serial colonoscopies.  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  Otherwise healthy and fun person.      Plan:       No intervention by me  Continue follow-up with his urologist for androgen deprivation  Continue follow-up with the [facility] for his CLL  Return to clinic with me on an as-needed basis.      What a delight it has been to take care of this very nice man.        Ashok Lewis MD, MSc  Associate Professor of Medicine  [facility]  [facility]  [facility]  [address] [place] [id number]  [phone]    __________________________________________________________________    Diagnosis     DIAGNOSIS:  Stage I chronic lymphocytic leukemia with FISH panel showing trisomy 12, indicating moderate to low risk disease.  There is never been an indication for treatment for Andrew.  Erythrocytosis that appears to be pathologic/physiologic.  Bob had a normal EPO level as well as an elevated carbon monoxide level.  A complete thorough work-up did not reveal a source of the carbon monoxide.  With observation this is all normalized, interestingly.  Clinical T2 cN0 M0 high risk prostate cancer, diagnosed at prostate biopsy 10/6/2020.  Rest presented with a PSA of 8.47 when it had previously been 2.09.  This was in March 2019.  Prostate MRI showed 2 separate lesions with a classification of 4 with high probability of clinically significant cancer.  There was some elevated enlarged lymph nodes consistent with his CLL.  Biopsy 10/6/2020 showed multiple areas of cancer bilaterally with the most concerning in the right lateral base because was a [place] 4+4 = 8 and 25% of the core.  There was also a [place] 8 and 50% of the core in the right lateral mid, and another Elizabeth 8 in the left base and left lateral base.  The right base had 40% involvement with [place] 4+3.        History of Present Illness/Therapy to Date:     We have never treated his CLL or his erythrocytosis  For his prostate cancer he had androgen deprivation initiated 11/24/2020 followed by IMRT 1/4 through 2/10/2021      Interval history:   Bob is back and feeling well.  He is now getting his care for CLL at the [facility].  He did receive Evushield there a year ago. OR IS HE????  He tells me he still benefits from our appointments  He feels well.  He and his wife have more trips planned.  He has no recent infections.  His appetite and energy level are  great.  Multiple other psychosocial stressors were discussed at length  The rest of a multiorgan review of physical symptoms is negative.          Past Medical History:     Past Medical History:   Diagnosis Date    Adenomatous polyp     Angioedema     Travis's oesophagus with dysplasia     CLL (chronic lymphocytic leukemia) (H) 03/22/2019    stage at dx Wilkes 0, CD 20 positive 11q del unkown, 17p del unknown    Gastro-oesophageal reflux disease     Hearing loss     mild    Hyperuricemia     Psoriasis     Renal disease     Borderline renal insufficiency; kidney stones    Tinnitus     Vocal cord granuloma           Past Surgical History:         Social History:   Tobacco, ETOH, and rec drugs reviewed and as noted below with the following exceptions:  Bob grew up in [place], [place], and graduated from high school in 1969.  He then went to college, majoring in math and business with a minor in creative writing.  He thought about studying theater, but apparently this was not allowed at his college.  Before graduating, he was excepted into the business masters program.  Upon graduation he was recruited by [employer] and moved to [place].  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  He met his wife Chelsey while he was in [place].  A friend of Jeromes had been interested in Willard, but by the time he decided to approach her, Chelsey's friend was .  The friend gave him some names of single friends and when muscle called, Chelsey answered.  Chelsey's family has been living in [place] since the 1880s.  They have 2 daughters together.  Their older daughter has a PhD in biology as well as a law degree and works with [employer] companies in [place].  The younger daughter went to [employer] and got a job as a contractor dealing and foreign services based in [place].  Bob speaks 3 languages, English,  bad Montserratian, and bad Tunisian. Chelsey, interestingly, was also diagnosed with CLL recently.          Family History:     Family History   Problem Relation Age of Onset    Coronary Artery Disease Mother          dementia age 96    Hypertension Father          kidney failure age 94    Cerebrovascular Disease Father     Gallbladder Disease Father     Snoring Father     Prostate Cancer Father 70        skin ca 50    Coronary Artery Disease Paternal Grandfather     Alcoholism Brother             Medications:     Current Outpatient Medications   Medication Sig Dispense Refill    Ascorbic Acid (VITAMIN C PO) Take 1,000 mg by mouth daily Takes with Vit E      ASPIRIN PO Take 81 mg by mouth daily      atenolol (TENORMIN) 50 MG tablet Take 25 mg by mouth      ATORVASTATIN CALCIUM PO Take 20 mg by mouth daily       febuxostat (ULORIC) 40 MG TABS tablet Take 40 mg by mouth      Fexofenadine HCl (ALLEGRA PO) Take 180 mg by mouth daily       multivitamin, therapeutic with minerals (MULTI-VITAMIN) TABS Take 1 tablet by mouth daily      OMEPRAZOLE PO Take 20 mg by mouth every morning      pantoprazole (PROTONIX) 40 MG EC tablet Take 40 mg by mouth Alternates with Omeprazole      Solifenacin Succinate (VESICARE PO) Take 5 mg by mouth daily      tamsulosin (FLOMAX) 0.4 MG capsule       VASCEPA 1 g CAPS Take 2 g by mouth 2 times daily                Physical Exam:   There were no vitals taken for this visit.    ECOG PS: 0  Constitutional: WDWN male in NAD, pleasant and appropriate  HEENT:  NC/AT, no icterus, OP clear, MMM  Skin: No jaundice nor ecchymoses  Lungs: CTAB, no w/r/r, nonlabored breathing  Cardiovascular: RRR, S1, S2, no m/r/g  Abdomen: +BS, soft, nontender, nondistended, no organomegaly nor masses  MSK/Extremities: Warm, well perfused. No edema  LN: no cervical, supraclavicular, axillary, nor inguinal lymphadenopathy  Neurologic: alert, answering questions appropriately, moving all extremities spontaneously. CN 2-12  grossly intact.  Psych: appropriate affect  Data:     Recent Labs   Lab Test 10/25/21  1258   WBC 9.4   NEUTROPHIL 45   HGB 15.2        Recent Labs   Lab Test 10/25/21  1258      POTASSIUM 4.2   CHLORIDE 104   CO2 29   ANIONGAP 6   BUN 23   CR 1.00   TYRON 9.5     No results for input(s): MAG, PHOS, LDH, URIC in the last 78934 hours.  Recent Labs   Lab Test 10/25/21  1258   BILITOTAL 0.7   ALKPHOS 92   ALT 38   AST 29   ALBUMIN 3.9       No results found for this or any previous visit (from the past 24 hour(s)).    Other data           Labs, imaging and treatment plan reviewed with patient. All questions answered.        25 minutes spent on the date of the encounter doing chart review, review of outside records, review of test results, interpretation of tests, patient visit and documentation         Again, thank you for allowing me to participate in the care of your patient.        Sincerely,        Ashok Lweis MD

## 2023-04-13 NOTE — NURSING NOTE
"Oncology Rooming Note    April 13, 2023 12:06 PM   Willard Burgos is a 72 year old male who presents for:    Chief Complaint   Patient presents with     Blood Draw     Labs drawn by RN via , vitals taken     Oncology Clinic Visit     CLL     Initial Vitals: /76   Pulse 61   Temp 98  F (36.7  C)   Resp 16   Wt 84.4 kg (186 lb)   SpO2 98%   BMI 31.93 kg/m   Estimated body mass index is 31.93 kg/m  as calculated from the following:    Height as of 5/9/19: 1.626 m (5' 4\").    Weight as of this encounter: 84.4 kg (186 lb). Body surface area is 1.95 meters squared.  No Pain (0) Comment: Data Unavailable   No LMP for male patient.  Allergies reviewed: Yes  Medications reviewed: Yes    Medications: Medication refills not needed today.  Pharmacy name entered into EPIC:    WRITTEN PRESCRIPTION REQUESTED  [id number] - [place], [place] - [address] [id number] [place], [place] - [address] [facility] [address]    Clinical concerns: No new clinical concerns other than reason for visit today.     Syl Carreon, EMT    "

## 2023-11-18 ENCOUNTER — HEALTH MAINTENANCE LETTER (OUTPATIENT)
Age: 73
End: 2023-11-18

## 2024-12-28 ENCOUNTER — HEALTH MAINTENANCE LETTER (OUTPATIENT)
Age: 74
End: 2024-12-28